# Patient Record
(demographics unavailable — no encounter records)

---

## 2021-09-10 NOTE — NUR
A/O X 4. Room air. Bedbound. Not able to hardly move postions, only supine in
bed with movement of arms due to back pain. Lidocaine patch on back. Left IV
AC dressing dry, clean, and intact. SCDS appiled. Morphine 4 mg IV given for
back pain @ 1010 and flexeril for spams in his back. Wife can to nurses
ststion for updated and plan. Paged Dr. Coelho. Awaiting call back to see if
patient will have an MRI of spine today.

## 2021-09-10 NOTE — NUR
PT ADMITTED  VIA CART FROM ER AT 0415.  PT ALERT AND ORIENTED X 4.  PT
C/O PAIN IN LOWER BACK.  MORPHINE GIVEN AS ORDERED WITH PARTIAL PAIN RELIEF
VERBALIZED.  ADMISSION HISTORY AND ASSESSMENT COMPLETED.  PT ORIENTED TO ROOM
AND USE OF CALL LIGHT.  SCD'S ON.  PT SLEEPING AT INTERVALS.